# Patient Record
Sex: FEMALE | Race: WHITE
[De-identification: names, ages, dates, MRNs, and addresses within clinical notes are randomized per-mention and may not be internally consistent; named-entity substitution may affect disease eponyms.]

---

## 2018-06-29 ENCOUNTER — TRANSCRIPTION ENCOUNTER (OUTPATIENT)
Age: 38
End: 2018-06-29

## 2019-10-31 ENCOUNTER — TRANSCRIPTION ENCOUNTER (OUTPATIENT)
Age: 39
End: 2019-10-31

## 2019-12-05 ENCOUNTER — RESULT REVIEW (OUTPATIENT)
Age: 39
End: 2019-12-05

## 2020-06-05 ENCOUNTER — ASOB RESULT (OUTPATIENT)
Age: 40
End: 2020-06-05

## 2020-06-05 ENCOUNTER — APPOINTMENT (OUTPATIENT)
Dept: ANTEPARTUM | Facility: CLINIC | Age: 40
End: 2020-06-05
Payer: COMMERCIAL

## 2020-06-05 PROCEDURE — 76816 OB US FOLLOW-UP PER FETUS: CPT

## 2020-06-16 ENCOUNTER — OUTPATIENT (OUTPATIENT)
Dept: INPATIENT UNIT | Facility: HOSPITAL | Age: 40
LOS: 1 days | Discharge: HOME | End: 2020-06-16
Payer: COMMERCIAL

## 2020-06-16 DIAGNOSIS — Z3A.00 WEEKS OF GESTATION OF PREGNANCY NOT SPECIFIED: ICD-10-CM

## 2020-06-16 PROCEDURE — G0463: CPT

## 2020-06-16 PROCEDURE — 59025 FETAL NON-STRESS TEST: CPT

## 2020-06-17 DIAGNOSIS — O47.9 FALSE LABOR, UNSPECIFIED: ICD-10-CM

## 2020-06-25 ENCOUNTER — INPATIENT (INPATIENT)
Facility: HOSPITAL | Age: 40
LOS: 2 days | Discharge: HOME | End: 2020-06-28
Attending: OBSTETRICS & GYNECOLOGY | Admitting: OBSTETRICS & GYNECOLOGY
Payer: COMMERCIAL

## 2020-06-25 VITALS — WEIGHT: 224.87 LBS | HEIGHT: 61 IN

## 2020-06-25 DIAGNOSIS — O32.4XX0 MATERNAL CARE FOR HIGH HEAD AT TERM, NOT APPLICABLE OR UNSPECIFIED: ICD-10-CM

## 2020-06-25 DIAGNOSIS — Z3A.38 38 WEEKS GESTATION OF PREGNANCY: ICD-10-CM

## 2020-06-25 DIAGNOSIS — Z23 ENCOUNTER FOR IMMUNIZATION: ICD-10-CM

## 2020-06-25 DIAGNOSIS — E66.01 MORBID (SEVERE) OBESITY DUE TO EXCESS CALORIES: ICD-10-CM

## 2020-06-25 DIAGNOSIS — O34.219 MATERNAL CARE FOR UNSPECIFIED TYPE SCAR FROM PREVIOUS CESAREAN DELIVERY: ICD-10-CM

## 2020-06-25 DIAGNOSIS — O61.0 FAILED MEDICAL INDUCTION OF LABOR: ICD-10-CM

## 2020-06-25 DIAGNOSIS — Z28.09 IMMUNIZATION NOT CARRIED OUT BECAUSE OF OTHER CONTRAINDICATION: ICD-10-CM

## 2020-06-25 DIAGNOSIS — O26.899 OTHER SPECIFIED PREGNANCY RELATED CONDITIONS, UNSPECIFIED TRIMESTER: ICD-10-CM

## 2020-06-25 LAB
BASOPHILS # BLD AUTO: 0.03 K/UL — SIGNIFICANT CHANGE UP (ref 0–0.2)
BASOPHILS NFR BLD AUTO: 0.3 % — SIGNIFICANT CHANGE UP (ref 0–2)
EOSINOPHIL # BLD AUTO: 0.15 K/UL — SIGNIFICANT CHANGE UP (ref 0–0.5)
EOSINOPHIL NFR BLD AUTO: 1.3 % — SIGNIFICANT CHANGE UP (ref 0–6)
HCT VFR BLD CALC: 32.3 % — LOW (ref 34.5–45)
HGB BLD-MCNC: 10.7 G/DL — LOW (ref 11.5–15.5)
IMM GRANULOCYTES NFR BLD AUTO: 1.3 % — SIGNIFICANT CHANGE UP (ref 0–1.5)
LYMPHOCYTES # BLD AUTO: 19.5 % — SIGNIFICANT CHANGE UP (ref 13–44)
LYMPHOCYTES # BLD AUTO: 2.28 K/UL — SIGNIFICANT CHANGE UP (ref 1–3.3)
MCHC RBC-ENTMCNC: 28.6 PG — SIGNIFICANT CHANGE UP (ref 27–34)
MCHC RBC-ENTMCNC: 33.1 GM/DL — SIGNIFICANT CHANGE UP (ref 32–36)
MCV RBC AUTO: 86.4 FL — SIGNIFICANT CHANGE UP (ref 80–100)
MONOCYTES # BLD AUTO: 0.83 K/UL — SIGNIFICANT CHANGE UP (ref 0–0.9)
MONOCYTES NFR BLD AUTO: 7.1 % — SIGNIFICANT CHANGE UP (ref 2–14)
NEUTROPHILS # BLD AUTO: 8.28 K/UL — HIGH (ref 1.8–7.4)
NEUTROPHILS NFR BLD AUTO: 70.5 % — SIGNIFICANT CHANGE UP (ref 43–77)
PLATELET # BLD AUTO: 197 K/UL — SIGNIFICANT CHANGE UP (ref 150–400)
RBC # BLD: 3.74 M/UL — LOW (ref 3.8–5.2)
RBC # FLD: 14.2 % — SIGNIFICANT CHANGE UP (ref 10.3–14.5)
SARS-COV-2 RNA SPEC QL NAA+PROBE: SIGNIFICANT CHANGE UP
T PALLIDUM AB TITR SER: NEGATIVE — SIGNIFICANT CHANGE UP
WBC # BLD: 11.72 K/UL — HIGH (ref 3.8–10.5)
WBC # FLD AUTO: 11.72 K/UL — HIGH (ref 3.8–10.5)

## 2020-06-25 PROCEDURE — 85025 COMPLETE CBC W/AUTO DIFF WBC: CPT

## 2020-06-25 PROCEDURE — 59050 FETAL MONITOR W/REPORT: CPT

## 2020-06-25 PROCEDURE — 94760 N-INVAS EAR/PLS OXIMETRY 1: CPT

## 2020-06-25 PROCEDURE — 59200 INSERT CERVICAL DILATOR: CPT

## 2020-06-25 PROCEDURE — 86780 TREPONEMA PALLIDUM: CPT

## 2020-06-25 PROCEDURE — 87635 SARS-COV-2 COVID-19 AMP PRB: CPT

## 2020-06-25 PROCEDURE — C1765: CPT

## 2020-06-25 PROCEDURE — 86900 BLOOD TYPING SEROLOGIC ABO: CPT

## 2020-06-25 PROCEDURE — 36415 COLL VENOUS BLD VENIPUNCTURE: CPT

## 2020-06-25 PROCEDURE — 86850 RBC ANTIBODY SCREEN: CPT

## 2020-06-25 PROCEDURE — 86901 BLOOD TYPING SEROLOGIC RH(D): CPT

## 2020-06-25 PROCEDURE — G0463: CPT

## 2020-06-25 PROCEDURE — 86769 SARS-COV-2 COVID-19 ANTIBODY: CPT

## 2020-06-25 PROCEDURE — 90715 TDAP VACCINE 7 YRS/> IM: CPT

## 2020-06-25 PROCEDURE — C1889: CPT

## 2020-06-25 RX ORDER — OXYTOCIN 10 UNIT/ML
333.33 VIAL (ML) INJECTION
Qty: 20 | Refills: 0 | Status: COMPLETED | OUTPATIENT
Start: 2020-06-25 | End: 2020-06-25

## 2020-06-25 RX ORDER — OXYTOCIN 10 UNIT/ML
2 VIAL (ML) INJECTION
Qty: 30 | Refills: 0 | Status: DISCONTINUED | OUTPATIENT
Start: 2020-06-25 | End: 2020-06-28

## 2020-06-25 RX ORDER — OXYTOCIN 10 UNIT/ML
2 VIAL (ML) INJECTION
Qty: 30 | Refills: 0 | Status: DISCONTINUED | OUTPATIENT
Start: 2020-06-25 | End: 2020-06-25

## 2020-06-25 RX ORDER — CITRIC ACID/SODIUM CITRATE 300-500 MG
15 SOLUTION, ORAL ORAL EVERY 6 HOURS
Refills: 0 | Status: DISCONTINUED | OUTPATIENT
Start: 2020-06-25 | End: 2020-06-26

## 2020-06-25 RX ORDER — SODIUM CHLORIDE 9 MG/ML
1000 INJECTION, SOLUTION INTRAVENOUS
Refills: 0 | Status: DISCONTINUED | OUTPATIENT
Start: 2020-06-25 | End: 2020-06-28

## 2020-06-25 RX ORDER — SODIUM CHLORIDE 9 MG/ML
1000 INJECTION, SOLUTION INTRAVENOUS
Refills: 0 | Status: DISCONTINUED | OUTPATIENT
Start: 2020-06-25 | End: 2020-06-26

## 2020-06-25 RX ORDER — BUTORPHANOL TARTRATE 2 MG/ML
2 INJECTION, SOLUTION INTRAMUSCULAR; INTRAVENOUS EVERY 4 HOURS
Refills: 0 | Status: DISCONTINUED | OUTPATIENT
Start: 2020-06-25 | End: 2020-06-28

## 2020-06-25 RX ADMIN — BUTORPHANOL TARTRATE 2 MILLIGRAM(S): 2 INJECTION, SOLUTION INTRAMUSCULAR; INTRAVENOUS at 18:24

## 2020-06-25 RX ADMIN — SODIUM CHLORIDE 125 MILLILITER(S): 9 INJECTION, SOLUTION INTRAVENOUS at 13:55

## 2020-06-25 NOTE — PATIENT PROFILE OB - ALERT: PERTINENT HISTORY
Ultra Screen at 12 Weeks/Follow up Sonogram for Growth/Fetal Non-Stress Test (NST)/Non Invasive Prenatal Screen (NIPS)/1st Trimester Sonogram/20 Week Level II Sonogram/BioPhysical Profile(s)

## 2020-06-26 ENCOUNTER — TRANSCRIPTION ENCOUNTER (OUTPATIENT)
Age: 40
End: 2020-06-26

## 2020-06-26 RX ORDER — KETOROLAC TROMETHAMINE 30 MG/ML
30 SYRINGE (ML) INJECTION ONCE
Refills: 0 | Status: DISCONTINUED | OUTPATIENT
Start: 2020-06-26 | End: 2020-06-27

## 2020-06-26 RX ORDER — TETANUS TOXOID, REDUCED DIPHTHERIA TOXOID AND ACELLULAR PERTUSSIS VACCINE, ADSORBED 5; 2.5; 8; 8; 2.5 [IU]/.5ML; [IU]/.5ML; UG/.5ML; UG/.5ML; UG/.5ML
0.5 SUSPENSION INTRAMUSCULAR ONCE
Refills: 0 | Status: COMPLETED | OUTPATIENT
Start: 2020-06-26

## 2020-06-26 RX ORDER — LANOLIN
1 OINTMENT (GRAM) TOPICAL EVERY 6 HOURS
Refills: 0 | Status: DISCONTINUED | OUTPATIENT
Start: 2020-06-26 | End: 2020-06-28

## 2020-06-26 RX ORDER — AZITHROMYCIN 500 MG/1
500 TABLET, FILM COATED ORAL ONCE
Refills: 0 | Status: COMPLETED | OUTPATIENT
Start: 2020-06-26 | End: 2020-06-26

## 2020-06-26 RX ORDER — OXYCODONE HYDROCHLORIDE 5 MG/1
5 TABLET ORAL
Refills: 0 | Status: DISCONTINUED | OUTPATIENT
Start: 2020-06-26 | End: 2020-06-28

## 2020-06-26 RX ORDER — HYDROMORPHONE HYDROCHLORIDE 2 MG/ML
1 INJECTION INTRAMUSCULAR; INTRAVENOUS; SUBCUTANEOUS
Refills: 0 | Status: DISCONTINUED | OUTPATIENT
Start: 2020-06-26 | End: 2020-06-28

## 2020-06-26 RX ORDER — ONDANSETRON 8 MG/1
4 TABLET, FILM COATED ORAL EVERY 6 HOURS
Refills: 0 | Status: DISCONTINUED | OUTPATIENT
Start: 2020-06-26 | End: 2020-06-28

## 2020-06-26 RX ORDER — ENOXAPARIN SODIUM 100 MG/ML
40 INJECTION SUBCUTANEOUS DAILY
Refills: 0 | Status: DISCONTINUED | OUTPATIENT
Start: 2020-06-26 | End: 2020-06-28

## 2020-06-26 RX ORDER — OXYCODONE HYDROCHLORIDE 5 MG/1
5 TABLET ORAL ONCE
Refills: 0 | Status: DISCONTINUED | OUTPATIENT
Start: 2020-06-26 | End: 2020-06-28

## 2020-06-26 RX ORDER — SIMETHICONE 80 MG/1
80 TABLET, CHEWABLE ORAL EVERY 4 HOURS
Refills: 0 | Status: DISCONTINUED | OUTPATIENT
Start: 2020-06-26 | End: 2020-06-28

## 2020-06-26 RX ORDER — ACETAMINOPHEN 500 MG
975 TABLET ORAL
Refills: 0 | Status: DISCONTINUED | OUTPATIENT
Start: 2020-06-26 | End: 2020-06-28

## 2020-06-26 RX ORDER — OXYCODONE HYDROCHLORIDE 5 MG/1
10 TABLET ORAL
Refills: 0 | Status: DISCONTINUED | OUTPATIENT
Start: 2020-06-26 | End: 2020-06-28

## 2020-06-26 RX ORDER — NALOXONE HYDROCHLORIDE 4 MG/.1ML
0.1 SPRAY NASAL
Refills: 0 | Status: DISCONTINUED | OUTPATIENT
Start: 2020-06-26 | End: 2020-06-28

## 2020-06-26 RX ORDER — ACETAMINOPHEN 500 MG
1000 TABLET ORAL ONCE
Refills: 0 | Status: COMPLETED | OUTPATIENT
Start: 2020-06-26 | End: 2020-06-26

## 2020-06-26 RX ORDER — OXYTOCIN 10 UNIT/ML
333.33 VIAL (ML) INJECTION
Qty: 20 | Refills: 0 | Status: DISCONTINUED | OUTPATIENT
Start: 2020-06-26 | End: 2020-06-28

## 2020-06-26 RX ORDER — MAGNESIUM HYDROXIDE 400 MG/1
30 TABLET, CHEWABLE ORAL
Refills: 0 | Status: DISCONTINUED | OUTPATIENT
Start: 2020-06-26 | End: 2020-06-28

## 2020-06-26 RX ORDER — IBUPROFEN 200 MG
600 TABLET ORAL EVERY 6 HOURS
Refills: 0 | Status: COMPLETED | OUTPATIENT
Start: 2020-06-26 | End: 2021-05-25

## 2020-06-26 RX ORDER — SODIUM CHLORIDE 9 MG/ML
1000 INJECTION, SOLUTION INTRAVENOUS
Refills: 0 | Status: DISCONTINUED | OUTPATIENT
Start: 2020-06-26 | End: 2020-06-28

## 2020-06-26 RX ORDER — FERROUS SULFATE 325(65) MG
1 TABLET ORAL
Qty: 0 | Refills: 0 | DISCHARGE

## 2020-06-26 RX ORDER — DIPHENHYDRAMINE HCL 50 MG
25 CAPSULE ORAL EVERY 6 HOURS
Refills: 0 | Status: DISCONTINUED | OUTPATIENT
Start: 2020-06-26 | End: 2020-06-28

## 2020-06-26 RX ORDER — MORPHINE SULFATE 50 MG/1
3 CAPSULE, EXTENDED RELEASE ORAL ONCE
Refills: 0 | Status: DISCONTINUED | OUTPATIENT
Start: 2020-06-26 | End: 2020-06-28

## 2020-06-26 RX ADMIN — SODIUM CHLORIDE 125 MILLILITER(S): 9 INJECTION, SOLUTION INTRAVENOUS at 05:24

## 2020-06-26 RX ADMIN — AZITHROMYCIN 255 MILLIGRAM(S): 500 TABLET, FILM COATED ORAL at 15:20

## 2020-06-26 RX ADMIN — Medication 1000 MILLIUNIT(S)/MIN: at 17:59

## 2020-06-26 RX ADMIN — SODIUM CHLORIDE 125 MILLILITER(S): 9 INJECTION, SOLUTION INTRAVENOUS at 06:07

## 2020-06-26 RX ADMIN — Medication 400 MILLIGRAM(S): at 18:17

## 2020-06-26 NOTE — PROGRESS NOTE ADULT - SUBJECTIVE AND OBJECTIVE BOX
Postpartum Note,  Section  She is a  40y woman who is now post-operative day: 0  Sadie just transferred to maternity. Called to see patient due to red Rashid dressing.    Pt hungry for broth, Feeling well. Will breast feed.  No chills, rigors nor incisional pain.   She denies nausea and vomiting.  She is breastfeeding.  She is formula feeding.    Allergies    No Known Allergies    Intolerances        Physical exam:    Vital Signs Last 24 Hrs  T(C): 36.8 (2020 19:45), Max: 38.1 (2020 19:00)  T(F): 98.2 (2020 19:45), Max: 100.6 (2020 19:00)  HR: 79 (2020 20:00) (79 - 110)  BP: 122/74 (2020 20:00) (109/62 - 132/84)  BP(mean): --  RR: 20 (2020 20:00) (14 - 21)  SpO2: 97% (2020 20:00) (92% - 100%)    Gen: NAD  Breast: Soft, nontender, not engorged.  Abdomen: Soft, nontender, no distension , firm uterine fundus at umbilicus.  Abdominal binder in place  Incision: 50% of Rashid moist with dried blood marked and labelled 17:25. No change at time of exam.   Pelvic: Normal lochia noted  Ext: No calf tenderness, moderate edema, Mechanical compression stockings in place    LABS:                        10.7   11.72 )-----------( 197      ( 2020 09:39 )             32.3     MEDICATIONS  (STANDING):  citric acid/sodium citrate Solution 15 milliLiter(s) Oral every 6 hours  dextrose 5% + lactated ringers. 1000 milliLiter(s) (125 mL/Hr) IV Continuous <Continuous>  enoxaparin Injectable 40 milliGRAM(s) SubCutaneous daily  lactated ringers. 1000 milliLiter(s) (125 mL/Hr) IV Continuous <Continuous>  morphine PF Epidural 3 milliGRAM(s) Epidural once  oxytocin Infusion 2 milliUNIT(s)/Min (2 mL/Hr) IV Continuous <Continuous>  prenatal multivitamin 1 Tablet(s) Oral daily    MEDICATIONS  (PRN):  butorphanol Injectable 2 milliGRAM(s) IV Push every 4 hours PRN Severe Pain (7 - 10)  HYDROmorphone  Injectable 1 milliGRAM(s) IV Push every 3 hours PRN Severe Pain (7 - 10)  naloxone Injectable 0.1 milliGRAM(s) IV Push every 3 minutes PRN For ANY of the following changes in patient status:  A. RR LESS THAN 10 breaths per minute, B. Oxygen saturation LESS THAN 90%, C. Sedation score of 6  ondansetron Injectable 4 milliGRAM(s) IV Push every 6 hours PRN Nausea  oxyCODONE    IR 5 milliGRAM(s) Oral every 3 hours PRN Mild Pain (1 - 3)  oxyCODONE    IR 10 milliGRAM(s) Oral every 3 hours PRN Moderate Pain (4 - 6)        Assessment and Plan  POD #0  s/p Repeat c/s for TOLAC    Hemodynamically stable  Decision to leave Rashid in place as it is functioning and bleeding has stopped.  Temperature in .6. Repeat normal. Will follow given long induction.  Call with temp >100.6  Help to establish breastfeeding.  check CBC in am unless blood on dressing increases.  Routing post operative care    TARSHA Caicedo MD

## 2020-06-26 NOTE — DISCHARGE NOTE OB - PLAN OF CARE
recovery no heavy lifting or straining for six weeks.  Please schedule a follow up visit seven days after surgery for incision check and removal of NELLY dressing.

## 2020-06-26 NOTE — DISCHARGE NOTE OB - HOSPITAL COURSE
39 yo  at 38 4/7 weeks gestation, GBS negative with h/o  x2 at term gestation for failed induction at 2cm and for breech presentation, respectively.  Pt presents c/o leaking fluid at 0530am, with minimal contraction, regular fetal movement, and no heavy bleeding.  She was admitted for induction of labor due to PROM to TOLAC.  Risks/benefits reviewed with patient and FOB.  Her CBC upon admission:                        10.7   11.72 )-----------( 197      ( 2020 09:39 )             32.3   Her COVID-19 test was negative, COVID Antibody test also negative.  Pt progress from closed to 5cm with a cervical ripening balloon and low dose pitocin.  She continued to progress with pitocin to 7cm dilatation but arrested at -3 station for over four hours.  A repeat  was performed, delivering a viable male infant of 8lb 6oz, Apgars 9/9. 39 yo  at 38 4/7 weeks gestation, GBS negative with h/o  x2 at term gestation for failed induction at 2cm and for breech presentation, respectively.  Pt presents c/o leaking fluid at 0530am, with minimal contraction, regular fetal movement, and no heavy bleeding.  She was admitted for induction of labor due to PROM to TOLAC.  Risks/benefits reviewed with patient and FOB.  Her CBC upon admission:                        10.7   11.72 )-----------( 197      ( 2020 09:39 )             32.3   Her COVID-19 test was negative, COVID Antibody test also negative.  Pt progress from closed to 5cm with a cervical ripening balloon and low dose pitocin.  She continued to progress with pitocin to 7cm dilatation but arrested at -3 station for over four hours.  A repeat  was performed, delivering a viable male infant of 8lb 6oz, Apgars 9/9.  Pt's recovery was uneventful and her postop CBC:                        8.2    12.85 )-----------( 147      ( 2020 07:56 )             24.3   She is being discharged home in stable condition with her  on POD#2.

## 2020-06-26 NOTE — DISCHARGE NOTE OB - MEDICATION SUMMARY - MEDICATIONS TO STOP TAKING
I will STOP taking the medications listed below when I get home from the hospital:    ferrous sulfate 325 mg (65 mg elemental iron) oral tablet  -- 1 tab(s) by mouth every other day (at bedtime)

## 2020-06-26 NOTE — DISCHARGE NOTE OB - CARE PROVIDER_API CALL
Jules Rich  [facility]  [address] [place] [address]  Phone: [phone]  Fax: [phone]  Established Patient  Scheduled Appointment: 07/02/2020

## 2020-06-26 NOTE — DISCHARGE NOTE OB - PATIENT PORTAL LINK FT
You can access the FollowMyHealth Patient Portal offered by [facility] by registering at the following website: [email or web address]. By joining [facility]’s FollowMyHealth portal, you will also be able to view your health information using other applications (apps) compatible with our system.

## 2020-06-26 NOTE — DISCHARGE NOTE OB - CARE PLAN
Principal Discharge DX:	 delivery, delivered, current hospitalization  Goal:	recovery  Assessment and plan of treatment:	no heavy lifting or straining for six weeks.  Please schedule a follow up visit seven days after surgery for incision check and removal of NELLY dressing.

## 2020-06-26 NOTE — DISCHARGE NOTE OB - MEDICATION SUMMARY - MEDICATIONS TO TAKE
I will START or STAY ON the medications listed below when I get home from the hospital:    ibuprofen 600 mg oral tablet  -- 1 tab(s) by mouth every 6 hours, As needed, Moderate Pain -for moderate pain MDD:4  -- Indication: For for moderate pain as needed    Prenatal 1 Plus 1 oral tablet  -- 1 tab(s) by mouth once a day  -- Indication: For continue daily

## 2020-06-27 LAB
BASOPHILS # BLD AUTO: 0.02 K/UL — SIGNIFICANT CHANGE UP (ref 0–0.2)
BASOPHILS NFR BLD AUTO: 0.2 % — SIGNIFICANT CHANGE UP (ref 0–2)
EOSINOPHIL # BLD AUTO: 0.02 K/UL — SIGNIFICANT CHANGE UP (ref 0–0.5)
EOSINOPHIL NFR BLD AUTO: 0.2 % — SIGNIFICANT CHANGE UP (ref 0–6)
HCT VFR BLD CALC: 24.3 % — LOW (ref 34.5–45)
HGB BLD-MCNC: 8.2 G/DL — LOW (ref 11.5–15.5)
IMM GRANULOCYTES NFR BLD AUTO: 0.9 % — SIGNIFICANT CHANGE UP (ref 0–1.5)
LYMPHOCYTES # BLD AUTO: 1.47 K/UL — SIGNIFICANT CHANGE UP (ref 1–3.3)
LYMPHOCYTES # BLD AUTO: 11.4 % — LOW (ref 13–44)
MCHC RBC-ENTMCNC: 29.3 PG — SIGNIFICANT CHANGE UP (ref 27–34)
MCHC RBC-ENTMCNC: 33.7 GM/DL — SIGNIFICANT CHANGE UP (ref 32–36)
MCV RBC AUTO: 86.8 FL — SIGNIFICANT CHANGE UP (ref 80–100)
MONOCYTES # BLD AUTO: 0.65 K/UL — SIGNIFICANT CHANGE UP (ref 0–0.9)
MONOCYTES NFR BLD AUTO: 5.1 % — SIGNIFICANT CHANGE UP (ref 2–14)
NEUTROPHILS # BLD AUTO: 10.58 K/UL — HIGH (ref 1.8–7.4)
NEUTROPHILS NFR BLD AUTO: 82.2 % — HIGH (ref 43–77)
PLATELET # BLD AUTO: 147 K/UL — LOW (ref 150–400)
RBC # BLD: 2.8 M/UL — LOW (ref 3.8–5.2)
RBC # FLD: 14.4 % — SIGNIFICANT CHANGE UP (ref 10.3–14.5)
WBC # BLD: 12.85 K/UL — HIGH (ref 3.8–10.5)
WBC # FLD AUTO: 12.85 K/UL — HIGH (ref 3.8–10.5)

## 2020-06-27 RX ORDER — IBUPROFEN 200 MG
600 TABLET ORAL EVERY 6 HOURS
Refills: 0 | Status: DISCONTINUED | OUTPATIENT
Start: 2020-06-27 | End: 2020-06-28

## 2020-06-27 RX ADMIN — Medication 30 MILLIGRAM(S): at 00:27

## 2020-06-27 RX ADMIN — Medication 600 MILLIGRAM(S): at 05:49

## 2020-06-27 RX ADMIN — Medication 975 MILLIGRAM(S): at 21:10

## 2020-06-27 RX ADMIN — OXYCODONE HYDROCHLORIDE 5 MILLIGRAM(S): 5 TABLET ORAL at 21:09

## 2020-06-27 RX ADMIN — Medication 600 MILLIGRAM(S): at 12:23

## 2020-06-27 RX ADMIN — ENOXAPARIN SODIUM 40 MILLIGRAM(S): 100 INJECTION SUBCUTANEOUS at 05:49

## 2020-06-27 RX ADMIN — Medication 600 MILLIGRAM(S): at 23:30

## 2020-06-27 RX ADMIN — OXYCODONE HYDROCHLORIDE 5 MILLIGRAM(S): 5 TABLET ORAL at 15:11

## 2020-06-27 RX ADMIN — Medication 600 MILLIGRAM(S): at 17:38

## 2020-06-27 RX ADMIN — Medication 975 MILLIGRAM(S): at 09:34

## 2020-06-27 NOTE — PROGRESS NOTE ADULT - SUBJECTIVE AND OBJECTIVE BOX
Postpartum Note,  Section  40y woman post-operative day:  #    Subjective:  She is ambulating and tolerating regular diet.  Pt denies dizziness  She denies nausea and vomiting and is passing flatus.    Physical exam:    Vital Signs Last 24 Hrs  T(C): 36.9 (2020 09:31), Max: 38.1 (2020 19:00)  T(F): 98.5 (2020 09:31), Max: 100.6 (2020 19:00)  HR: 113 (2020 09:31) (78 - 113)  BP: 108/64 (2020 09:31) (108/64 - 135/80)  BP(mean): --  RR: 18 (2020 09:31) (14 - 21)  SpO2: 99% (2020 09:31) (92% - 100%)  Lungs:  clear to auscultation bilaterally.  Abdomen: Soft, nontender, nondistended, bowel sounds normoactive  Incision: Clean, dry, and intact.  Pelvic: light  lochia noted  Ext: No calf tenderness    LABS:                        8.2    12.85 )-----------( 147      ( 2020 07:56 )             24.3     Assessment and Plan  POD # 1  s/p     Doing well.  Encourage ambulation.  routine postpartum education.

## 2020-06-28 VITALS
HEART RATE: 89 BPM | TEMPERATURE: 98 F | SYSTOLIC BLOOD PRESSURE: 125 MMHG | DIASTOLIC BLOOD PRESSURE: 68 MMHG | OXYGEN SATURATION: 98 % | RESPIRATION RATE: 18 BRPM

## 2020-06-28 RX ORDER — IBUPROFEN 200 MG
1 TABLET ORAL
Qty: 30 | Refills: 1
Start: 2020-06-28 | End: 2020-08-26

## 2020-06-28 RX ORDER — FAMOTIDINE 10 MG/ML
1 INJECTION INTRAVENOUS
Qty: 0 | Refills: 0 | DISCHARGE

## 2020-06-28 RX ORDER — TETANUS TOXOID, REDUCED DIPHTHERIA TOXOID AND ACELLULAR PERTUSSIS VACCINE, ADSORBED 5; 2.5; 8; 8; 2.5 [IU]/.5ML; [IU]/.5ML; UG/.5ML; UG/.5ML; UG/.5ML
0.5 SUSPENSION INTRAMUSCULAR ONCE
Refills: 0 | Status: COMPLETED | OUTPATIENT
Start: 2020-06-28 | End: 2020-06-28

## 2020-06-28 RX ADMIN — Medication 600 MILLIGRAM(S): at 05:48

## 2020-06-28 RX ADMIN — TETANUS TOXOID, REDUCED DIPHTHERIA TOXOID AND ACELLULAR PERTUSSIS VACCINE, ADSORBED 0.5 MILLILITER(S): 5; 2.5; 8; 8; 2.5 SUSPENSION INTRAMUSCULAR at 11:39

## 2020-06-28 RX ADMIN — Medication 600 MILLIGRAM(S): at 11:40

## 2020-06-28 RX ADMIN — Medication 975 MILLIGRAM(S): at 02:52

## 2020-06-28 RX ADMIN — ENOXAPARIN SODIUM 40 MILLIGRAM(S): 100 INJECTION SUBCUTANEOUS at 05:49

## 2020-06-28 RX ADMIN — Medication 975 MILLIGRAM(S): at 09:18

## 2020-06-28 RX ADMIN — OXYCODONE HYDROCHLORIDE 5 MILLIGRAM(S): 5 TABLET ORAL at 11:40

## 2020-06-28 NOTE — PROGRESS NOTE ADULT - SUBJECTIVE AND OBJECTIVE BOX
Postpartum Note,  Section  40y woman post-operative day:  #2    Subjective:  She is ambulating and tolerating regular diet.  Pt denies dizziness  She denies nausea and vomiting and is passing flatus.  Pt is distressed about staying another day in house and desires discharge home if possible.    Physical exam:    Vital Signs Last 24 Hrs  T(C): 36.8 (2020 23:46), Max: 36.9 (2020 09:31)  T(F): 98.3 (2020 23:46), Max: 98.5 (2020 09:31)  HR: 96 (2020 23:46) (96 - 113)  BP: 144/79 (2020 23:46) (108/64 - 144/79)  BP(mean): --  RR: 18 (2020 23:46) (18 - 18)  SpO2: 96% (2020 23:46) (96% - 99%)  Lungs:  clear to auscultation bilaterally.  Abdomen: Soft, nontender, nondistended, bowel sounds normoactive  Incision: Clean, dry, and intact.  Pelvic: light  lochia noted  Ext: No calf tenderness    LABS:                        8.2    12.85 )-----------( 147      ( 2020 07:56 )             24.3         Assessment and Plan  POD # 2  s/p repeat     Doing well.  Encourage ambulation.  Follow up in 4-7 days for post-op incision check, then in six weeks for postpartum appointment.  Pt advised to follow up sooner as an outpt if she has any difficulties with breast feeding/depression/temperature over 100.2'F.

## 2022-09-24 ENCOUNTER — NON-APPOINTMENT (OUTPATIENT)
Age: 42
End: 2022-09-24

## 2024-11-06 ENCOUNTER — APPOINTMENT (OUTPATIENT)
Dept: OBGYN | Facility: CLINIC | Age: 44
End: 2024-11-06
Payer: COMMERCIAL

## 2024-11-06 VITALS
WEIGHT: 163.5 LBS | SYSTOLIC BLOOD PRESSURE: 102 MMHG | HEIGHT: 60 IN | BODY MASS INDEX: 32.1 KG/M2 | DIASTOLIC BLOOD PRESSURE: 70 MMHG

## 2024-11-06 DIAGNOSIS — Z82.49 FAMILY HISTORY OF ISCHEMIC HEART DISEASE AND OTHER DISEASES OF THE CIRCULATORY SYSTEM: ICD-10-CM

## 2024-11-06 DIAGNOSIS — Z81.8 FAMILY HISTORY OF OTHER MENTAL AND BEHAVIORAL DISORDERS: ICD-10-CM

## 2024-11-06 DIAGNOSIS — Z78.9 OTHER SPECIFIED HEALTH STATUS: ICD-10-CM

## 2024-11-06 DIAGNOSIS — Z98.891 HISTORY OF UTERINE SCAR FROM PREVIOUS SURGERY: ICD-10-CM

## 2024-11-06 DIAGNOSIS — Z12.4 ENCOUNTER FOR SCREENING FOR MALIGNANT NEOPLASM OF CERVIX: ICD-10-CM

## 2024-11-06 DIAGNOSIS — Z12.39 ENCOUNTER FOR OTHER SCREENING FOR MALIGNANT NEOPLASM OF BREAST: ICD-10-CM

## 2024-11-06 DIAGNOSIS — Z01.419 ENCOUNTER FOR GYNECOLOGICAL EXAMINATION (GENERAL) (ROUTINE) W/OUT ABNORMAL FINDINGS: ICD-10-CM

## 2024-11-06 DIAGNOSIS — Z87.898 PERSONAL HISTORY OF OTHER SPECIFIED CONDITIONS: ICD-10-CM

## 2024-11-06 PROCEDURE — 99459 PELVIC EXAMINATION: CPT

## 2024-11-06 PROCEDURE — 99386 PREV VISIT NEW AGE 40-64: CPT

## 2024-11-06 RX ORDER — PNV NO.95/FERROUS FUM/FOLIC AC 28MG-0.8MG
TABLET ORAL
Refills: 0 | Status: ACTIVE | COMMUNITY

## 2024-11-06 RX ORDER — OMEGA-3/DHA/EPA/FISH OIL 300-1000MG
CAPSULE ORAL
Refills: 0 | Status: ACTIVE | COMMUNITY

## 2024-11-06 RX ORDER — TIRZEPATIDE 10 MG/.5ML
10 INJECTION, SOLUTION SUBCUTANEOUS
Refills: 0 | Status: ACTIVE | COMMUNITY

## 2024-11-06 RX ORDER — CHROMIUM 200 MCG
TABLET ORAL
Refills: 0 | Status: ACTIVE | COMMUNITY

## 2024-11-07 LAB
C TRACH RRNA SPEC QL NAA+PROBE: NOT DETECTED
HPV HIGH+LOW RISK DNA PNL CVX: NOT DETECTED
N GONORRHOEA RRNA SPEC QL NAA+PROBE: NOT DETECTED
SOURCE TP AMPLIFICATION: NORMAL

## 2024-11-14 ENCOUNTER — OUTPATIENT (OUTPATIENT)
Dept: OUTPATIENT SERVICES | Facility: HOSPITAL | Age: 44
LOS: 1 days | End: 2024-11-14
Payer: COMMERCIAL

## 2024-11-14 ENCOUNTER — APPOINTMENT (OUTPATIENT)
Dept: MAMMOGRAPHY | Facility: CLINIC | Age: 44
End: 2024-11-14
Payer: COMMERCIAL

## 2024-11-14 ENCOUNTER — RESULT REVIEW (OUTPATIENT)
Age: 44
End: 2024-11-14

## 2024-11-14 DIAGNOSIS — Z12.39 ENCOUNTER FOR OTHER SCREENING FOR MALIGNANT NEOPLASM OF BREAST: ICD-10-CM

## 2024-11-14 PROCEDURE — 77063 BREAST TOMOSYNTHESIS BI: CPT

## 2024-11-14 PROCEDURE — 77067 SCR MAMMO BI INCL CAD: CPT

## 2024-11-14 PROCEDURE — 77067 SCR MAMMO BI INCL CAD: CPT | Mod: 26

## 2024-11-14 PROCEDURE — 77063 BREAST TOMOSYNTHESIS BI: CPT | Mod: 26

## 2024-11-15 LAB — CYTOLOGY CVX/VAG DOC THIN PREP: NORMAL
